# Patient Record
(demographics unavailable — no encounter records)

---

## 2025-02-28 NOTE — PHYSICAL EXAM
[Chaperone Present] : A chaperone was present in the examining room during all aspects of the physical examination [FreeTextEntry2] : pema [place] [Appropriately responsive] : appropriately responsive [Alert] : alert [No Acute Distress] : no acute distress [No Lymphadenopathy] : no lymphadenopathy [Regular Rate Rhythm] : regular rate rhythm [No Murmurs] : no murmurs [Soft] : soft [Non-tender] : non-tender [Non-distended] : non-distended [No HSM] : No HSM [No Lesions] : no lesions [No Mass] : no mass [Oriented x3] : oriented x3 [FreeTextEntry3] : No adenopathy [FreeTextEntry6] : No Masses axillary adenopathy or nipple discharge [Labia Majora] : normal [Labia Minora] : normal [Normal] : normal [Uterine Adnexae] : normal [FreeTextEntry5] : Cervix is nulliparous and everted

## 2025-02-28 NOTE — PLAN
[FreeTextEntry1] : Normal initial encounter Breakthrough bleeding on hormonal contraception Normal TVS We have discussed the options of both nonhormonal as well as hormonal contraception.  As the endometrium appears atrophic we discussed using a birth control pill with a little higher estrogen profile and discussed the differences between the range of 10 mcg through 35 mcg birth control pills.  The patient would like to try a 30 mcg pill and this will be prescribed We discussed the side effects and risks of birth control pills including DVT weight gain and mood changes She is instructed in breast self-examination Follow-up 6 months if the problem is resolved

## 2025-02-28 NOTE — HISTORY OF PRESENT ILLNESS
[FreeTextEntry1] : Initial examination for this 28-year-old  0 for a gynecological checkup and evaluation of breakthrough bleeding on contraceptives Patient has been on the contraceptive patch and subsequently on Junel 1/20 but continues to have breakthrough staining usually during the last week before her menses [id number] There is usually no postcoital bleeding unless it is just before her menses There are no current personal medical issues and no medications No known allergies Family history unremarkable Surgical history negative Social history unremarkable No history of STDs or pelvic infection Patient did receive the Gardasil vaccine